# Patient Record
Sex: FEMALE | Race: WHITE | NOT HISPANIC OR LATINO | URBAN - METROPOLITAN AREA
[De-identification: names, ages, dates, MRNs, and addresses within clinical notes are randomized per-mention and may not be internally consistent; named-entity substitution may affect disease eponyms.]

---

## 2020-02-01 ENCOUNTER — EMERGENCY (EMERGENCY)
Facility: HOSPITAL | Age: 30
LOS: 1 days | Discharge: HOME | End: 2020-02-01
Attending: EMERGENCY MEDICINE | Admitting: EMERGENCY MEDICINE
Payer: COMMERCIAL

## 2020-02-01 VITALS
SYSTOLIC BLOOD PRESSURE: 127 MMHG | OXYGEN SATURATION: 100 % | HEART RATE: 79 BPM | TEMPERATURE: 98 F | DIASTOLIC BLOOD PRESSURE: 65 MMHG | RESPIRATION RATE: 18 BRPM

## 2020-02-01 VITALS
RESPIRATION RATE: 15 BRPM | SYSTOLIC BLOOD PRESSURE: 125 MMHG | TEMPERATURE: 98 F | DIASTOLIC BLOOD PRESSURE: 68 MMHG | HEART RATE: 74 BPM | OXYGEN SATURATION: 100 %

## 2020-02-01 LAB
FLU A RESULT: NOT DETECTED — SIGNIFICANT CHANGE UP
FLU A RESULT: NOT DETECTED — SIGNIFICANT CHANGE UP
FLUAV AG NPH QL: NOT DETECTED — SIGNIFICANT CHANGE UP
FLUBV AG NPH QL: NOT DETECTED — SIGNIFICANT CHANGE UP
RSV RESULT: SIGNIFICANT CHANGE UP
RSV RNA RESP QL NAA+PROBE: SIGNIFICANT CHANGE UP

## 2020-02-01 PROCEDURE — 99283 EMERGENCY DEPT VISIT LOW MDM: CPT

## 2020-02-01 RX ORDER — ACETAMINOPHEN 500 MG
650 TABLET ORAL ONCE
Refills: 0 | Status: COMPLETED | OUTPATIENT
Start: 2020-02-01 | End: 2020-02-01

## 2020-02-01 RX ORDER — IBUPROFEN 200 MG
600 TABLET ORAL ONCE
Refills: 0 | Status: COMPLETED | OUTPATIENT
Start: 2020-02-01 | End: 2020-02-01

## 2020-02-01 RX ADMIN — Medication 650 MILLIGRAM(S): at 04:29

## 2020-02-01 RX ADMIN — Medication 600 MILLIGRAM(S): at 04:29

## 2020-02-01 RX ADMIN — Medication 100 MILLIGRAM(S): at 04:29

## 2020-02-01 NOTE — ED PROVIDER NOTE - PHYSICAL EXAMINATION
General appearance: NAD, conversant, afebrile    Eyes: conjunctiva clear   HENT: Atraumatic; oropharynx clear, MMM and no ulcerations, no pharyngeal erythema or exudate, b/l TM clear   Neck: Trachea midline; Full range of motion, supple   Pulm: CTA bl, normal respiratory effort and no intercostal retractions, normal work of breathing   CV: RRR, No murmurs, rubs, or gallop   Extremities: No peripheral edema   Skin: Dry, normal temperature, turgor and texture; no rash   Psych: Appropriate affect, cooperative;

## 2020-02-01 NOTE — ED PROVIDER NOTE - ATTENDING CONTRIBUTION TO CARE
Attending Attestation: Dr. Park  I have personally performed a history and physical examination of the patient and discussed management with the resident as well as the patient.  I reviewed the resident's note and agree with the documented findings and plan of care.  I have authored and modified critical sections of the Provider Note, including but not limited to HPI, Physical Exam and MDM. 30yo female with non contrib pmh presenting with symptoms consistent with viral uri.  Provide Sx Tx and reassess.

## 2020-02-01 NOTE — ED PROVIDER NOTE - PATIENT PORTAL LINK FT
You can access the FollowMyHealth Patient Portal offered by [facility] [place] by registering at the following website: [email or web address]. By joining [employer]’s FollowMyHealth portal, you will also be able to view your health information using other applications (apps) compatible with our system.

## 2020-02-01 NOTE — ED PROVIDER NOTE - OBJECTIVE STATEMENT
28yo female with no pmh presenting with cough, congestion, sore throat that began monday.  Symptoms have not resolved despite taking dayquil, robitussin, mucinex. 28yo female with no pmh presenting with cough, congestion, sore throat, headache, and b/l ear pain that began monday.  Symptoms have not resolved despite taking dayquil, robitussin, mucinex.  No subjective fevers at home, has not taken her temp.  No cp, sob.  No sick contacts.  Has received flu vaccine. 30yo female with non contrib pmh presenting with cough, congestion, sore throat, headache, and b/l ear pain that began monday.  Symptoms have not resolved despite taking dayquil, robitussin, mucinex.  No subjective fevers at home, has not taken her temp.  No cp, sob.  No sick contacts.  Has received flu vaccine. on recent travel.

## 2020-02-01 NOTE — ED ADULT TRIAGE NOTE - CHIEF COMPLAINT QUOTE
Pt c/o sinus pressure, sore throat, ear pain, and neck pain since Monday.  Pt endorses cough, productive of yellow phlegm.  Has been taking mucinex, dayquil, and alkaseltzer sinus without relief.   Denies any PMHx.

## 2020-02-01 NOTE — ED PROVIDER NOTE - CLINICAL SUMMARY MEDICAL DECISION MAKING FREE TEXT BOX
30yo female with no pmh presenting with symptoms consistent with viral uri. 30yo female with non contrib pmh presenting with symptoms consistent with viral uri.  Provide Sx Tx and reassess.

## 2020-02-01 NOTE — ED PROVIDER NOTE - NSFOLLOWUPINSTRUCTIONS_ED_ALL_ED_FT
Rest, drink plenty of fluids  Advance activity as tolerated  Continue all previously prescribed medications as directed  Follow up with your PMD   Take Tylenol 650mg every six hours and supplement with ibuprofen 600mg, with food, every six hours which can be taken three hours apart from the Tylenol to have a layered effect.   Return to the ER for symptoms that do not improve within a week, difficulty breathing, pain in the chest, or other new or concerning symptoms.

## 2020-02-01 NOTE — ED ADULT TRIAGE NOTE - NS ED TRIAGE AVPU SCALE
Alert-The patient is alert, awake and responds to voice. The patient is oriented to time, place, and person. The triage nurse is able to obtain subjective information.
11-Jan-2017 07:58

## 2020-02-01 NOTE — ED ADULT NURSE NOTE - OBJECTIVE STATEMENT
Break coverage RN: Pt received in room 6, A&OX4, ambulatory. Pt reports to ED complaining of congestion, ear ache, headache, sore throat for past week. Pt reports taking Mucinex, Dayquil, alkaseltzer sinus at home without relief. Pt denies nausea, vomiting, diarrhea, chest pain, shortness of breath. MD at bedside for evaluation. Medication given as per MD order. RVP collected, pt placed on droplet until RVP results come back. Will continue to monitor.

## 2023-01-05 NOTE — ED ADULT TRIAGE NOTE - INTERNATIONAL TRAVEL
Post-Anesthesia Evaluation/Assessment    Cardiovascular Function/Vital Signs  Visit Vitals  /71   Pulse 81   Temp 36.6 °C (97.8 °F)   Resp 16   Ht 5' 5\" (1.651 m)   Wt 78.3 kg (172 lb 9.6 oz)   SpO2 100%   BMI 28.72 kg/m²       Patient is status post Procedure(s):  ROBOTIC TOTAL LAPAROSCOPIC HYSTERECTOMY BILATERAL SALPINGECTOMY. Nausea/Vomiting: Controlled. Postoperative hydration reviewed and adequate. Pain:  Pain Scale 1: Numeric (0 - 10) (01/05/23 1621)  Pain Intensity 1: 3 (01/05/23 1621)   Managed. Neurological Status/Mental Status and Level of Consciousness:   Appropriately returning/progressing to baseline     Pulmonary Status:   O2 Device: None (Room air) (01/05/23 1621)   Adequate oxygenation and airway patent. No apparent anesthetic complications.      Signed By: Eloina Kuhn MD    January 5, 2023
No